# Patient Record
Sex: FEMALE | Race: WHITE | NOT HISPANIC OR LATINO | URBAN - METROPOLITAN AREA
[De-identification: names, ages, dates, MRNs, and addresses within clinical notes are randomized per-mention and may not be internally consistent; named-entity substitution may affect disease eponyms.]

---

## 2018-01-14 ENCOUNTER — EMERGENCY (EMERGENCY)
Facility: HOSPITAL | Age: 63
LOS: 1 days | Discharge: HOME | End: 2018-01-14
Attending: EMERGENCY MEDICINE | Admitting: EMERGENCY MEDICINE
Payer: COMMERCIAL

## 2018-01-14 VITALS
DIASTOLIC BLOOD PRESSURE: 96 MMHG | HEART RATE: 74 BPM | SYSTOLIC BLOOD PRESSURE: 158 MMHG | RESPIRATION RATE: 15 BRPM | OXYGEN SATURATION: 100 %

## 2018-01-14 VITALS
SYSTOLIC BLOOD PRESSURE: 168 MMHG | DIASTOLIC BLOOD PRESSURE: 89 MMHG | HEART RATE: 82 BPM | RESPIRATION RATE: 16 BRPM | OXYGEN SATURATION: 99 %

## 2018-01-14 PROBLEM — Z00.00 ENCOUNTER FOR PREVENTIVE HEALTH EXAMINATION: Status: ACTIVE | Noted: 2018-01-14

## 2018-01-14 PROCEDURE — 99284 EMERGENCY DEPT VISIT MOD MDM: CPT

## 2018-01-14 PROCEDURE — 99282 EMERGENCY DEPT VISIT SF MDM: CPT

## 2018-01-14 NOTE — ED PROVIDER NOTE - PHYSICAL EXAMINATION
Attending note. Patient is alert and in no acute distress. Pupils are 3 mm equal reactive. There is no scleral injection. Oropharynx is normal. Skin is normal. Lungs are clear without wheezes rales or rhonchi. Patient has good air entry bilaterally without cough or bronchospasm. Heart regular rate and rhythm no murmur.

## 2018-01-14 NOTE — ED PROVIDER NOTE - OBJECTIVE STATEMENT
64 yo female in room 6 here for evaluation s/p chemical inhalation exposure. 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 When I walked into the office the odor was so strong and irritating  to my lungs I covered my face with my winter coat and had to leave work because I did not  feel safe  to remain at work under these conditions.  Today I feel like I am having trouble taking a deep breath and I can not get all the air in my lungs". Pt denies sob, cp, dizziness at this time.   Denies nausea and vomiting. 64 yo female in room 6 here for evaluation s/p chemical inhalation exposure. 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 When I walked into the office the odor was so strong and irritating  to my lungs I covered my face with my winter coat and had to leave work because I did not  feel safe  to remain at work under these conditions.  Today I feel like I am having trouble taking a deep breath and I can not get all the air in my lungs". Pt denies sob, cp, dizziness at this time.   Denies nausea and vomiting.       Attending note. Patient was seen in the fast track from #6. Agree with the above. Patient was exposed to an aerosol at work which caused her feel headachy and tightness in the chest as well as dizziness. Patient continues to feel some slight tightness in the chest. Patient has no headaches, dizziness, nausea vomiting or skin irritation presently.

## 2018-01-14 NOTE — ED PROVIDER NOTE - MEDICAL DECISION MAKING DETAILS
s/p exposure to chemicals in office while the floor were being finished yesterday-  no resp distress  at this time. s/p exposure to chemicals in office while the floor were being finished yesterday-  no resp distress  at this time.     Attending note. An ablation of noxious aerosol. Transients headache and dizziness as well as mild bronchospasm. Patient declines albuterol nebulizer. Patient queries whether or not there is some antitoxin or detox medication for it she can take.

## 2018-01-14 NOTE — ED PROVIDER NOTE - CARE PLAN
Principal Discharge DX:	Inhalation of noxious fumes, accidental or unintentional, initial encounter  Instructions for follow-up, activity and diet:	Please follow up with your Primary MD in 24-48 hr.  Seek immediate medical care for any new/worsening signs or symptoms.   Return to the ER immediately for any difficulty breathing  or chest pains. Stay well hydrated. Follow up with your primary care provider  in the next week. No work for 3 days!!!

## 2018-01-14 NOTE — ED ADULT TRIAGE NOTE - CHIEF COMPLAINT QUOTE
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

## 2018-01-14 NOTE — ED PROVIDER NOTE - PLAN OF CARE
Please follow up with your Primary MD in 24-48 hr.  Seek immediate medical care for any new/worsening signs or symptoms.   Return to the ER immediately for any difficulty breathing  or chest pains. Stay well hydrated. Follow up with your primary care provider  in the next week. No work for 3 days!!!

## 2018-05-01 ENCOUNTER — EMERGENCY (EMERGENCY)
Facility: HOSPITAL | Age: 63
LOS: 1 days | Discharge: HOME | End: 2018-05-01
Attending: EMERGENCY MEDICINE | Admitting: EMERGENCY MEDICINE
Payer: COMMERCIAL

## 2018-05-01 VITALS
TEMPERATURE: 98 F | OXYGEN SATURATION: 99 % | HEART RATE: 89 BPM | SYSTOLIC BLOOD PRESSURE: 180 MMHG | DIASTOLIC BLOOD PRESSURE: 99 MMHG | RESPIRATION RATE: 19 BRPM

## 2018-05-01 PROCEDURE — 99283 EMERGENCY DEPT VISIT LOW MDM: CPT

## 2018-05-01 RX ORDER — CYCLOBENZAPRINE HYDROCHLORIDE 10 MG/1
1 TABLET, FILM COATED ORAL
Qty: 12 | Refills: 0 | OUTPATIENT
Start: 2018-05-01 | End: 2018-05-04

## 2018-05-01 RX ORDER — TETANUS TOXOID, REDUCED DIPHTHERIA TOXOID AND ACELLULAR PERTUSSIS VACCINE, ADSORBED 5; 2.5; 8; 8; 2.5 [IU]/.5ML; [IU]/.5ML; UG/.5ML; UG/.5ML; UG/.5ML
0.5 SUSPENSION INTRAMUSCULAR ONCE
Qty: 0 | Refills: 0 | Status: DISCONTINUED | OUTPATIENT
Start: 2018-05-01 | End: 2018-05-01

## 2018-05-01 RX ORDER — IBUPROFEN 200 MG
600 TABLET ORAL ONCE
Qty: 0 | Refills: 0 | Status: COMPLETED | OUTPATIENT
Start: 2018-05-01 | End: 2018-05-01

## 2018-05-01 RX ADMIN — Medication 600 MILLIGRAM(S): at 17:25
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
## 2018-05-01 NOTE — ED PROVIDER NOTE - MUSCULOSKELETAL MINIMAL EXAM
FROM of all 4 extremities without any pain elicited. No midline spinal TTP.  area of ecchymosis and TTP over right proximal tibia but with no deformity or crepitus. NVI distally x 4 extremities.
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
## 2019-04-01 ENCOUNTER — APPOINTMENT (OUTPATIENT)
Dept: OTOLARYNGOLOGY | Facility: CLINIC | Age: 64
End: 2019-04-01
Payer: MEDICARE

## 2019-04-01 DIAGNOSIS — Z86.39 PERSONAL HISTORY OF OTHER ENDOCRINE, NUTRITIONAL AND METABOLIC DISEASE: ICD-10-CM

## 2019-04-01 PROCEDURE — 99213 OFFICE O/P EST LOW 20 MIN: CPT | Mod: 25

## 2019-04-01 PROCEDURE — 31575 DIAGNOSTIC LARYNGOSCOPY: CPT

## 2019-04-01 NOTE — ASSESSMENT
[FreeTextEntry1] : Clinically she is quite stable. She has had slight increased size of her complex dominant nodule on the right. However explained to her that this is very common with lesions that are partially cystic. On at least 2 occasions that lesion has been biopsied and found to be benign-is a category 2. The other nodule on the right is actually gotten smaller. The other nodules are otherwise stable. I recommended observation. She could consider surgery- but the only indication is for her psychological piece of mind. I did however explain her that there are risks associated with surgery. Additionally it will render her hypothyroid if she undergoes a total thyroidectomy. She would also have approximately 25-30% chance of having hypothyroidism in the setting of a lobectomy.\par \par I did explain to her that there is a small number false positives on biopsies which should be 1% or less on fna's.  I am recommending repeat sonogram in 6 months.

## 2019-04-01 NOTE — CONSULT LETTER
[Dear  ___] : Dear  [unfilled], [Consult Letter:] : I had the pleasure of evaluating your patient, [unfilled]. [Please see my note below.] : Please see my note below. [Sincerely,] : Sincerely, [FreeTextEntry3] : Raji Romero MD

## 2019-04-01 NOTE — PROCEDURE
[de-identified] : PROCEDURE: Flexible laryngoscopy\par SURGEON: Dr. Romero\par INDICATIONS: He was unable to tolerate a mirror exam. Assess for laryngopharynx pharyngeal reflux. cough. head and neck mass. \par ANESTHESIA: The patient was placed in a sitting position.  Following application of the topical anesthetic and decongestant, exam was performed with a flexible scope.  The scope was passed along the right nasal floor to the nasopharynx.  It was then passed into the region of the middle meatus, middle turbinate, and sphenoethmoid region.  An identical procedure was performed on the left side.  The following findings were noted:\par \par The nasal musoca was healthy appearing and the septum was roughly midline. The middle meatus and sphenoethmoid recesses were clear bilaterally. The nasopharynx \par \par Nasopharynx: no masses, choanae patent, no adenoid tissue\par \par Base of tongue/vallecula: no masses or asymmetry\par Pharyngeal walls: symmetrical. No masses\par Pyriform sinuses: no lesions or pooling of secretions\par Epiglottis: normal. No edema or lesions\par Aryepiglottic folds: normal. No lesions. \par Vocal cords: clear and mobile. No lesions. Airway patent\par Arytenoids: no edema or erythema \par Interarytenoid area: no edema, erythema or lesion.\par \par

## 2019-04-01 NOTE — HISTORY OF PRESENT ILLNESS
[de-identified] : This woman is known to me. Have not seen her in just over one year.\par She initially presented to me with a history of a multinodular thyroid gland. At the time of visiting me she had seen 2 surgeons who recommended surgery. She had a dominant right-sided nodule that was 2.4 cm and the cytology was benign. She does not a family history of thyroid cancer or radiation exposure. She did have an aunt that had a goiter. Her thyroid functions were normal at that time.\par \par At my suggestion in an attempt to observe her and avoid surgery.  I suggested ultrasound-guided fine-needle aspiration of her smaller nodules. These were all [place] category 2. I recommended observation. She considered surgery because she was nervous about the nodules. She does not have any compressive features from these nodules.\par \par Her recent repeat sonogram demonstrated that the dominant right-sided lesion went from 2.4 cm to 3.2 cm. However the lesion is complex with cystic component. The other right-sided lesion that was 1.5 cm and solid is now 1.1 cm. The other lesions are stable.\par Of note she does have an enlarged right-sided thyroid lobe.\par Her recent thyroid functions were wnl.

## 2020-09-25 ENCOUNTER — RESULT REVIEW (OUTPATIENT)
Age: 65
End: 2020-09-25

## 2020-12-15 ENCOUNTER — APPOINTMENT (OUTPATIENT)
Dept: BREAST CENTER | Facility: CLINIC | Age: 65
End: 2020-12-15

## 2021-05-08 NOTE — ED PROVIDER NOTE - DISCHARGE DATE
DISCHARGE INSTRUCTIONS    1. Diet:  · Healthy diet as tolerated  · Remember to stay well hydrated  · Recommend high protein intake (to help wound healing) and high fiber intake (to avoid constipation) and avoid unnecessary sugar/carbs    2. Activity:  · Ok for light activities (walking, showering, etc) as tolerated  · Make an effort to be out of bed walking multiple times per day. Walking will reduce the chance of developing complications such as pneumonia or blood clots in your legs  · No heavy lifting >15 lbs, strenuous activity, or contact sports for at least 6 weeks following surgery, as these activities will increase risk of developing a recurrent hernia    3. Wound care / Showering:  · May shower with regular soap and water, but no soaking underwater (in pools, tubs, etc) until wounds fully healed and cleared by MD in clinic  · Skin glue: Allow the skin glue over your incisions to come off on its own. Glue will typically start to come off after 1-2 weeks. Ok to shower and lightly wash incisions but do not pick at them or peel the glue off prematurely    4. Driving:  · Do not drive while taking narcotic pain medications  · You may resume driving when you no longer require narcotics or sedating medications AND you are physically able to perform the movements necessary for safe driving (turning to check your blind spot, etc)    5. Medications:  · Prescription pain meds: Use narcotic (ie: opiod) pain meds if needed for severe pain only, but decrease the amount you use each day  · Over-the-counter pain meds: Ok to use over-the-counter meds such as tylenol (acetaminophen) and motrin (ibuprofen) as directed, BUT: Do not use motrin (or other \"NSAIDs\") more than 5 days in a row as this can cause bleeding / ulcers, and do not use tylenol at the same time as Percocet, Vicodin, or Norco as these medications contain tylenol  · Bowel regimen: Recommend stool softeners (ex: colace) and/or laxatives (ex: miralax or milk of  jocelinesia) until you resume regular bowel function, especially if using narcotic pain meds which cause constipation    6. Follow-up:  · With Dr Parham or Yassine LÓPEZ or Laura LÓPEZ in surgery clinic for post-op appt in approx two weeks. Following your discharge please call to either schedule or confirm appointment.  · Phone number for the surgery office at [facility] (office of Dr. Parham and colleagues) is [phone]  · Follow up with Primary Care Provider for ongoing treatment of other medical conditions and medication management  · Call MD or seek evaluation if you experience worsening pain, nausea/vomiting or trouble staying hydrated, inability to urinate or have a bowel movement, new redness, swelling, or foul-smelling drainage from incisions, fever (especially >101.5), questions about instructions, or any concern you do NOT feel is an emergency  · Call 911 or go to the nearest ER for difficulty breathing, passing-out / fainting (or feeling like you are about to pass-out), severe chest pain, massive bleeding from anywhere, or anything else you DO think may be an emergency         01-May-2018

## 2021-08-25 ENCOUNTER — APPOINTMENT (OUTPATIENT)
Dept: INTERNAL MEDICINE | Facility: CLINIC | Age: 66
End: 2021-08-25
Payer: MEDICARE

## 2021-08-25 VITALS
SYSTOLIC BLOOD PRESSURE: 125 MMHG | OXYGEN SATURATION: 100 % | TEMPERATURE: 98.6 F | WEIGHT: 115.41 LBS | HEIGHT: 60.63 IN | DIASTOLIC BLOOD PRESSURE: 63 MMHG | BODY MASS INDEX: 22.07 KG/M2 | HEART RATE: 67 BPM

## 2021-08-25 DIAGNOSIS — R74.8 ABNORMAL LEVELS OF OTHER SERUM ENZYMES: ICD-10-CM

## 2021-08-25 DIAGNOSIS — Z85.3 PERSONAL HISTORY OF MALIGNANT NEOPLASM OF BREAST: ICD-10-CM

## 2021-08-25 DIAGNOSIS — R68.89 OTHER GENERAL SYMPTOMS AND SIGNS: ICD-10-CM

## 2021-08-25 DIAGNOSIS — C50.919 MALIGNANT NEOPLASM OF UNSPECIFIED SITE OF UNSPECIFIED FEMALE BREAST: ICD-10-CM

## 2021-08-25 DIAGNOSIS — Z00.00 ENCOUNTER FOR GENERAL ADULT MEDICAL EXAMINATION W/OUT ABNORMAL FINDINGS: ICD-10-CM

## 2021-08-25 PROCEDURE — 36415 COLL VENOUS BLD VENIPUNCTURE: CPT

## 2021-08-25 PROCEDURE — G0439: CPT

## 2021-08-25 PROCEDURE — 99214 OFFICE O/P EST MOD 30 MIN: CPT | Mod: 25

## 2021-08-25 NOTE — ASSESSMENT
[FreeTextEntry1] : Blood work done today\par Wishes to check zinc levels\par Advised vitamin B12 supplementation previous vitamin B12 at 234\par Alkaline phosphatase elevation possibly secondary to chemotherapy check GGT\par Advised follow-up with head and neck surgery and endocrinology for multinodular thyroid

## 2021-08-25 NOTE — HEALTH RISK ASSESSMENT
[Good] : ~his/her~  mood as  good [FreeTextEntry1] : Multinodular goiter [] : No [No] : No [0] : 2) Feeling down, depressed, or hopeless: Not at all (0) [de-identified] : [facility] [Patient reported colonoscopy was normal] : Patient reported colonoscopy was normal [Change in mental status noted] : No change in mental status noted [Language] : denies difficulty with language [Behavior] : denies difficulty with behavior [Learning/Retaining New Information] : denies difficulty learning/retaining new information [Handling Complex Tasks] : denies difficulty handling complex tasks [Reasoning] : denies difficulty with reasoning [Spatial Ability and Orientation] : denies difficulty with spatial ability and orientation [None] : None [Alone] : lives alone [Fully functional (bathing, dressing, toileting, transferring, walking, feeding)] : Fully functional (bathing, dressing, toileting, transferring, walking, feeding) [Fully functional (using the telephone, shopping, preparing meals, housekeeping, doing laundry, using] : Fully functional and needs no help or supervision to perform IADLs (using the telephone, shopping, preparing meals, housekeeping, doing laundry, using transportation, managing medications and managing finances) [Reports changes in hearing] : Reports no changes in hearing [Reports changes in vision] : Reports no changes in vision [Reports normal functional visual acuity (ie: able to read med bottle)] : Reports poor functional visual acuity.  [Reports changes in dental health] : Reports changes in dental health [Smoke Detector] : smoke detector [Carbon Monoxide Detector] : carbon monoxide detector [Guns at Home] : no guns at home [Safety elements used in home] : safety elements used in home [Seat Belt] :  uses seat belt [Sunscreen] : uses sunscreen [Travel to Developing Areas] : does not  travel to developing areas [TB Exposure] : is not being exposed to tuberculosis [Caregiver Concerns] : does not have caregiver concerns [ColonoscopyDate] : 1/2017

## 2021-08-25 NOTE — HISTORY OF PRESENT ILLNESS
[FreeTextEntry1] : Patient presents for subsequent Medicare annual wellness visit [de-identified] : Has a history of breast cancer follow [facility] had double mastectomy\par Has a multinodular goiter recently had an ultrasound has appointment for neck surgery tomorrow\par Denies any chest pain chest tightness shortness of breath palpitations\par Last colonoscopy was 3 to 4 years ago\par Not interested in bone density currently wants to limit risk of radiation\par Recent testing showed elevation of alkaline phosphatase and low vitamin B12\par Previous allergy to vaccine for pneumonia

## 2021-08-25 NOTE — PHYSICAL EXAM
[Normal] : affect was normal and insight and judgment were intact [de-identified] : Enlarged multinodular goiter

## 2021-08-26 ENCOUNTER — APPOINTMENT (OUTPATIENT)
Dept: SURGERY | Facility: CLINIC | Age: 66
End: 2021-08-26
Payer: MEDICARE

## 2021-08-26 ENCOUNTER — APPOINTMENT (OUTPATIENT)
Dept: SURGERY | Facility: CLINIC | Age: 66
End: 2021-08-26

## 2021-08-26 VITALS
HEIGHT: 60 IN | SYSTOLIC BLOOD PRESSURE: 130 MMHG | HEART RATE: 71 BPM | WEIGHT: 115 LBS | DIASTOLIC BLOOD PRESSURE: 75 MMHG | BODY MASS INDEX: 22.58 KG/M2

## 2021-08-26 DIAGNOSIS — Z78.9 OTHER SPECIFIED HEALTH STATUS: ICD-10-CM

## 2021-08-26 DIAGNOSIS — E04.9 NONTOXIC GOITER, UNSPECIFIED: ICD-10-CM

## 2021-08-26 DIAGNOSIS — Z87.891 PERSONAL HISTORY OF NICOTINE DEPENDENCE: ICD-10-CM

## 2021-08-26 DIAGNOSIS — N28.1 CYST OF KIDNEY, ACQUIRED: ICD-10-CM

## 2021-08-26 PROCEDURE — 99204 OFFICE O/P NEW MOD 45 MIN: CPT

## 2021-08-29 PROBLEM — Z87.891 FORMER SMOKER: Status: ACTIVE | Noted: 2021-08-29

## 2021-08-29 PROBLEM — N28.1 RENAL CYST: Status: RESOLVED | Noted: 2021-08-29 | Resolved: 2021-08-29

## 2021-08-29 PROBLEM — Z78.9 DENIES ALCOHOL CONSUMPTION: Status: ACTIVE | Noted: 2021-08-29

## 2021-08-29 RX ORDER — ANASTROZOLE TABLETS 1 MG/1
1 TABLET ORAL
Refills: 0 | Status: ACTIVE | COMMUNITY

## 2021-08-29 NOTE — HISTORY OF PRESENT ILLNESS
[de-identified] : Patient referred by Dr. Tate, for evaluation of large, multinodular goiter. Patient was noted to have thyroid nodules 2017. Had been scheduled for surgery in 2018, however, patient did not proceed. Patient now presents for consultation regarding treatment of large multinodular goiter. Patient was diagnosed with right stage I breast cancer September 2020. Has undergone multiple surgeries, followed by chemotherapy. Patient reports normal thyroid functions, denies dysphagia or change in voice or radiation treatment. Patient was in [place] at the time of [place]. Thyroid ultrasound December 2019: Right lobe 6.4 x 2.4 x 2.3 CM with upper 3.3 x 2.5 x 2.6 CM nodule, mid, 9 x 6 x 11 mm nodule stable. Left lobe 4.3 x 1.4 x 1.7 CM with stable mid nodule, measuring 10 x 6 x 7 mm. No enlarged lymph nodes. Biopsy, right nodules, February 2018 benign. Blood work, March 2019: TSH 1.6, T4 1.2, peroxidase antibody negative. Patient reports occasional pressure sensation with swallowing.  I have reviewed all old and new data and available images.

## 2021-08-29 NOTE — CONSULT LETTER
[Dear  ___] : Dear  [unfilled], [Please see my note below.] : Please see my note below. [Consult Letter:] : I had the pleasure of evaluating your patient, [unfilled]. [Consult Closing:] : Thank you very much for allowing me to participate in the care of this patient.  If you have any questions, please do not hesitate to contact me. [FreeTextEntry2] : Dr. Aurelia Tate [FreeTextEntry3] : Sincerely yours,\par \par Adriane Serna MD, FACS\par Assistant Professor of Surgery and Otolaryngology\par [facility] [DrYaz  ___] : Dr. NOLEN

## 2021-08-29 NOTE — ASSESSMENT
[FreeTextEntry1] : Patient with long history of multinodular goiter. I've discussed continued observation versus surgical excision. I requested a followup ultrasound to assess change in size.  I have reviewed the pathophysiology of the disease process, the area anatomy and the rationale for surgery.  I have discussed the risks, benefits and alternative treatments which include but are not limited to bleeding, infection, numbness, hoarseness, hypocalcemia, scarring, and need for reoperation. I have answered the patient's questions to their satisfaction. The patient will contact my office to review results  if no surgery scheduled, repeat US 2/2022 RTo 6 mo.

## 2021-08-29 NOTE — PHYSICAL EXAM
[de-identified] : no cervical or supraclavicular adenopathy, trachea midline, thyroid without enlargement with large right upper nodule 3.5 cm  [Normal] : no neck adenopathy [de-identified] : Skin:  normal appearance.  no rash, nodules, vesicles, or erythema,\par Musculoskeletal:  full range of motion and no deformities appreciated\par Neurological:  grossly intact\par Psychiatric:  oriented to person, place and time with appropriate affect

## 2021-08-29 NOTE — REASON FOR VISIT
[Initial Consultation] : an initial consultation for [FreeTextEntry2] : large MNG [Other: _____] : [unfilled]

## 2021-08-30 LAB
25(OH)D3 SERPL-MCNC: 53.2 NG/ML
ALBUMIN SERPL ELPH-MCNC: 4.7 G/DL
ALP BLD-CCNC: 155 U/L
ALP BLD-CCNC: 156 U/L
ALT SERPL-CCNC: 19 U/L
AST SERPL-CCNC: 22 U/L
BILIRUB DIRECT SERPL-MCNC: 0.1 MG/DL
BILIRUB INDIRECT SERPL-MCNC: 0.2 MG/DL
BILIRUB SERPL-MCNC: 0.2 MG/DL
GGT SERPL-CCNC: 12 U/L
PROT SERPL-MCNC: 7.1 G/DL
T3FREE SERPL-MCNC: 2.57 PG/ML
T4 FREE SERPL-MCNC: 1.1 NG/DL
THYROGLOB AB SERPL-ACNC: <20 IU/ML
THYROPEROXIDASE AB SERPL IA-ACNC: 16.1 IU/ML
TSH SERPL-ACNC: 0.99 UIU/ML

## 2021-09-01 LAB — ZINC SERPL-MCNC: 72 UG/DL

## 2021-09-15 ENCOUNTER — APPOINTMENT (OUTPATIENT)
Dept: OBGYN | Facility: CLINIC | Age: 66
End: 2021-09-15
Payer: MEDICARE

## 2021-09-15 VITALS
HEART RATE: 76 BPM | HEIGHT: 60 IN | SYSTOLIC BLOOD PRESSURE: 158 MMHG | WEIGHT: 115 LBS | BODY MASS INDEX: 22.58 KG/M2 | DIASTOLIC BLOOD PRESSURE: 71 MMHG

## 2021-09-15 DIAGNOSIS — Z01.419 ENCOUNTER FOR GYNECOLOGICAL EXAMINATION (GENERAL) (ROUTINE) W/OUT ABNORMAL FINDINGS: ICD-10-CM

## 2021-09-15 PROCEDURE — G0101: CPT

## 2021-09-22 ENCOUNTER — APPOINTMENT (OUTPATIENT)
Dept: ENDOCRINOLOGY | Facility: CLINIC | Age: 66
End: 2021-09-22
Payer: MEDICARE

## 2021-09-22 VITALS
OXYGEN SATURATION: 99 % | SYSTOLIC BLOOD PRESSURE: 171 MMHG | BODY MASS INDEX: 22.58 KG/M2 | DIASTOLIC BLOOD PRESSURE: 69 MMHG | TEMPERATURE: 95.9 F | HEART RATE: 67 BPM | HEIGHT: 60 IN | WEIGHT: 115 LBS

## 2021-09-22 PROCEDURE — 99203 OFFICE O/P NEW LOW 30 MIN: CPT

## 2021-09-26 NOTE — PHYSICAL EXAM
[Alert] : alert [Well Nourished] : well nourished [Healthy Appearance] : healthy appearance [No Acute Distress] : no acute distress [Well Developed] : well developed [Normal Voice/Communication] : normal voice communication [Normal Sclera/Conjunctiva] : normal sclera/conjunctiva [No Proptosis] : no proptosis [No LAD] : no lymphadenopathy [Supple] : the neck was supple [No Respiratory Distress] : no respiratory distress [Normal Rate] : heart rate was normal [No Stigmata of Cushings Syndrome] : no stigmata of Cushings Syndrome [Normal Gait] : normal gait [No Clubbing, Cyanosis] : no clubbing  or cyanosis of the fingernails [No Involuntary Movements] : no involuntary movements were seen [Acanthosis Nigricans] : no acanthosis nigricans [No Tremors] : no tremors [Normal Affect] : the affect was normal [Normal Insight/Judgement] : insight and judgment were intact [Normal Mood] : the mood was normal [de-identified] : right appx 4cm thyroid nodule

## 2021-09-26 NOTE — HISTORY OF PRESENT ILLNESS
[FreeTextEntry1] : CC: Thyroid nodules\par This is a 66-year-old female with thyroid nodules, history of [place] radiation exposure, breast cancer status post double mastectomy, chemotherapy, on anastrozole, here for evaluation.\par She reports that she noticed a lump on the right side of her neck after a chest x-ray in 2018. \par Thyroid ultrasound from August 23, 2021 showed right lobe 7.4 x 2.3 x 2.3 cm, predominantly cystic and solid nodule in the mid to upper pole measuring 3.7 x 2.1 x 2.8 cm, mid gland 1.6 x 0.7 x 1.6 cm thyroid nodule, lower pole new 9 x 5 x 8 mm thyroid nodule, left mid gland 1.2 x 0.5 x 1.1 cm thyroid nodule, right isthmus 8 x 4 x 8 mm thyroid nodule.\par \par FNA:\par 4/11/2017: Right 2.5 cm thyroid nodule: Benign\par Isthmus 0.8 cm: Benign\par \par 1/17/2018: Right 2.8 cm nodule: Benign\par \par 2/5/2018: Right upper 2.4 cm thyroid nodule: Benign\par Right mid 1.9 cm thyroid nodule: Benign\par Right isthmus 0.8 cm thyroid nodule: Benign

## 2021-09-26 NOTE — ASSESSMENT
[FreeTextEntry1] : This is a 66-year-old female with thyroid nodules, history of [place] radiation exposure, breast cancer status post double mastectomy, chemotherapy, on anastrozole, here for evaluation.\par Thyroid ultrasound from August 23, 2021 showed right lobe 7.4 x 2.3 x 2.3 cm, predominantly cystic and solid nodule in the mid to upper pole measuring 3.7 x 2.1 x 2.8 cm, mid gland 1.6 x 0.7 x 1.6 cm thyroid nodule, lower pole new 9 x 5 x 8 mm thyroid nodule, left mid gland 1.2 x 0.5 x 1.1 cm thyroid nodule, right isthmus 8 x 4 x 8 mm thyroid nodule.\par \par FNA:\par 4/11/2017: Right 2.5 cm thyroid nodule: Benign\par Isthmus 0.8 cm: Benign\par \par 1/17/2018: Right 2.8 cm nodule: Benign\par \par 2/5/2018: Right upper 2.4 cm thyroid nodule: Benign\par Right mid 1.9 cm thyroid nodule: Benign\par Right isthmus 0.8 cm thyroid nodule: Benign\par \par TFTs are normal.\par She is clinically euthyroid.\par Reviewed management of thyroid nodules with patient.  She does not wish to undergo thyroidectomy.  Reviewed possible complications including hyperthyroidism due to toxic nodule and compression of surrounding structures.\par \par She does not wish to undergo bone density test because she wants to reduce radiation exposure.

## 2021-10-01 LAB
CYTOLOGY CVX/VAG DOC THIN PREP: ABNORMAL
HPV HIGH+LOW RISK DNA PNL CVX: NOT DETECTED

## 2021-10-18 ENCOUNTER — NON-APPOINTMENT (OUTPATIENT)
Age: 66
End: 2021-10-18

## 2021-11-02 ENCOUNTER — APPOINTMENT (OUTPATIENT)
Dept: SURGERY | Facility: CLINIC | Age: 66
End: 2021-11-02
Payer: MEDICARE

## 2021-11-02 VITALS
WEIGHT: 115 LBS | HEIGHT: 60 IN | BODY MASS INDEX: 22.58 KG/M2 | SYSTOLIC BLOOD PRESSURE: 130 MMHG | DIASTOLIC BLOOD PRESSURE: 73 MMHG | HEART RATE: 76 BPM

## 2021-11-02 DIAGNOSIS — E04.2 NONTOXIC MULTINODULAR GOITER: ICD-10-CM

## 2021-11-02 PROCEDURE — 99204 OFFICE O/P NEW MOD 45 MIN: CPT
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
## 2021-11-04 NOTE — PHYSICAL EXAM
[de-identified] : 3.5 cm right and 1 cm left thyroid nodules,well circumscribed and mobile [Laryngoscopy Performed] : laryngoscopy was performed, see procedure section for findings [Midline] : located in midline position [Normal] : orientation to person, place, and time: normal [de-identified] : indirect  laryngoscopy shows normal vocal cord mobility bilaterally with no lesions noted

## 2021-11-04 NOTE — CONSULT LETTER
[Dear  ___] : Dear  [unfilled], [Consult Letter:] : I had the pleasure of evaluating your patient, [unfilled]. [Please see my note below.] : Please see my note below. [Consult Closing:] : Thank you very much for allowing me to participate in the care of this patient.  If you have any questions, please do not hesitate to contact me. [Sincerely,] : Sincerely, [FreeTextEntry2] : Dr. Rambo Landon [FreeTextEntry3] : Efren Jeffery MD, FACS\par System Director, Endocrine Surgery\par [facility]\par Assistant Clinical Professor of Surgery\par [facility] [employer] [facility]

## 2021-11-04 NOTE — ASSESSMENT
[FreeTextEntry1] : lengthy discussion regarding options for management including active surveillance  vs thyroid lobectomy with possible paratracheal node dissection, with or without frozen section vs total thyroidectomy with possible paratracheal node dissection.  risks, benefits and alternatives discussed at length.  I have discussed with the patient the anatomy of the area, the pathophysiology of the disease process and the rationale for surgery.  The attendant risks, possible complications and expected postoperative course have been discussed in detail.  I have given the patient the opportunity to ask questions, and all questions have been answered to the patient's satisfaction.  she will consider the options and contact this office if she wishes to proceed with surgery. otherwise, will pursue active surveillance and repeat sonogram in February, 6 months after last study, to assess change in size of nodules.

## 2022-02-17 ENCOUNTER — APPOINTMENT (OUTPATIENT)
Dept: SURGERY | Facility: CLINIC | Age: 67
End: 2022-02-17

## 2022-03-01 ENCOUNTER — NON-APPOINTMENT (OUTPATIENT)
Age: 67
End: 2022-03-01

## 2022-03-28 ENCOUNTER — NON-APPOINTMENT (OUTPATIENT)
Age: 67
End: 2022-03-28

## 2022-10-20 ENCOUNTER — APPOINTMENT (RX ONLY)
Dept: URBAN - METROPOLITAN AREA CLINIC 102 | Facility: CLINIC | Age: 67
Setting detail: DERMATOLOGY
End: 2022-10-20

## 2022-10-20 DIAGNOSIS — D22 MELANOCYTIC NEVI: ICD-10-CM | Status: INADEQUATELY CONTROLLED

## 2022-10-20 DIAGNOSIS — T07XXXA INSECT BITE, NONVENOMOUS, OF OTHER, MULTIPLE, AND UNSPECIFIED SITES, WITHOUT MENTION OF INFECTION: ICD-10-CM | Status: INADEQUATELY CONTROLLED

## 2022-10-20 PROBLEM — D48.5 NEOPLASM OF UNCERTAIN BEHAVIOR OF SKIN: Status: ACTIVE | Noted: 2022-10-20

## 2022-10-20 PROBLEM — S80.862A INSECT BITE (NONVENOMOUS), LEFT LOWER LEG, INITIAL ENCOUNTER: Status: ACTIVE | Noted: 2022-10-20

## 2022-10-20 PROBLEM — S80.861A INSECT BITE (NONVENOMOUS), RIGHT LOWER LEG, INITIAL ENCOUNTER: Status: ACTIVE | Noted: 2022-10-20

## 2022-10-20 PROCEDURE — ? PRESCRIPTION

## 2022-10-20 PROCEDURE — ? COUNSELING

## 2022-10-20 PROCEDURE — ? DEFER

## 2022-10-20 PROCEDURE — 99204 OFFICE O/P NEW MOD 45 MIN: CPT

## 2022-10-20 PROCEDURE — ? ADDITIONAL NOTES

## 2022-10-20 RX ORDER — TRIAMCINOLONE ACETONIDE 1 MG/G
DAB CREAM TOPICAL BID
Qty: 15 | Refills: 0 | Status: ERX | COMMUNITY
Start: 2022-10-20

## 2022-10-20 RX ORDER — LORATADINE 10 MG/1
1 CAPSULE, LIQUID FILLED ORAL DAILY
Qty: 30 | Refills: 0 | Status: ERX | COMMUNITY
Start: 2022-10-20

## 2022-10-20 RX ADMIN — LORATADINE 1: 10 CAPSULE, LIQUID FILLED ORAL at 00:00

## 2022-10-20 RX ADMIN — TRIAMCINOLONE ACETONIDE DAB: 1 CREAM TOPICAL at 00:00

## 2022-10-20 ASSESSMENT — LOCATION DETAILED DESCRIPTION DERM
LOCATION DETAILED: LEFT PROXIMAL PRETIBIAL REGION
LOCATION DETAILED: RIGHT PROXIMAL PRETIBIAL REGION
LOCATION DETAILED: RIGHT POPLITEAL SKIN

## 2022-10-20 ASSESSMENT — LOCATION SIMPLE DESCRIPTION DERM
LOCATION SIMPLE: RIGHT PRETIBIAL REGION
LOCATION SIMPLE: RIGHT POPLITEAL SKIN
LOCATION SIMPLE: LEFT PRETIBIAL REGION

## 2022-10-20 ASSESSMENT — LOCATION ZONE DERM: LOCATION ZONE: LEG

## 2022-10-20 NOTE — PROCEDURE: ADDITIONAL NOTES
Additional Notes: Patient was very concerned and nervous about doing the biopsy. She signed informed refusal and scheduled a separate appointment to come back.
Render Risk Assessment In Note?: no
Detail Level: Zone

## 2022-10-20 NOTE — PROCEDURE: DEFER
X Size Of Lesion In Cm (Optional): 0
Other Procedure: biopsy
Detail Level: Detailed
Introduction Text (Please End With A Colon): Patient needs to schedule separate appointment
Size Of Lesion In Cm (Optional): 0.2

## 2022-10-26 ENCOUNTER — APPOINTMENT (OUTPATIENT)
Dept: INTERNAL MEDICINE | Facility: CLINIC | Age: 67
End: 2022-10-26

## 2022-10-27 ENCOUNTER — OUTPATIENT (OUTPATIENT)
Dept: OUTPATIENT SERVICES | Facility: HOSPITAL | Age: 67
LOS: 1 days | End: 2022-10-27
Payer: MEDICARE

## 2022-10-27 ENCOUNTER — APPOINTMENT (OUTPATIENT)
Dept: ULTRASOUND IMAGING | Facility: CLINIC | Age: 67
End: 2022-10-27

## 2022-10-27 DIAGNOSIS — E04.2 NONTOXIC MULTINODULAR GOITER: ICD-10-CM

## 2022-10-27 PROCEDURE — 76536 US EXAM OF HEAD AND NECK: CPT

## 2022-10-27 PROCEDURE — 76536 US EXAM OF HEAD AND NECK: CPT | Mod: 26

## 2022-12-21 ENCOUNTER — APPOINTMENT (RX ONLY)
Dept: URBAN - METROPOLITAN AREA CLINIC 168 | Facility: CLINIC | Age: 67
Setting detail: DERMATOLOGY
End: 2022-12-21

## 2022-12-21 DIAGNOSIS — Z87.2 PERSONAL HISTORY OF DISEASES OF THE SKIN AND SUBCUTANEOUS TISSUE: ICD-10-CM

## 2022-12-21 PROCEDURE — ? SUTURE REMOVAL (NO GLOBAL PERIOD)

## 2022-12-21 PROCEDURE — 99203 OFFICE O/P NEW LOW 30 MIN: CPT

## 2022-12-21 PROCEDURE — ? COUNSELING

## 2022-12-21 PROCEDURE — ? ADDITIONAL NOTES

## 2022-12-21 ASSESSMENT — LOCATION DETAILED DESCRIPTION DERM: LOCATION DETAILED: RIGHT DISTAL CALF

## 2022-12-21 ASSESSMENT — LOCATION SIMPLE DESCRIPTION DERM: LOCATION SIMPLE: RIGHT CALF

## 2022-12-21 ASSESSMENT — LOCATION ZONE DERM: LOCATION ZONE: LEG

## 2022-12-21 NOTE — PROCEDURE: ADDITIONAL NOTES
Render Risk Assessment In Note?: no
Detail Level: Simple
Additional Notes: We reviewed her previous biopsy reports. 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 No further treatment needed at this time.

## 2022-12-21 NOTE — HPI: SKIN LESION
What Type Of Note Output Would You Prefer (Optional)?: Bullet Format
Is This A New Presentation, Or A Follow-Up?: Skin Lesion
Additional History: Patient is coming in for an evaluation on an area that has been Biopsy Proven, patient just recently moved from [place]. Patient would like to be established in practice, has some questions regarding some moles.

## 2023-06-16 NOTE — ED PROVIDER NOTE - CROS ED NEURO ALL NEG
Detail Level: Detailed Additional Comments: Partial dehiscence superiorly. \\nRecommended cleaning wound with 50/50 solution of hydrogen peroxide and water. Patient and her  daughter verbalized understanding.  \\nApply mupirocin BID negative...

## 2024-12-30 NOTE — REASON FOR VISIT
[Subsequent Evaluation] : a subsequent evaluation for [FreeTextEntry2] : thyroid problem. AE: Patient is well-appearing and behaving at her baseline.  Temperature has improved.  Patient will follow-up with her pediatrician.  Will discharge with strict return precautions.